# Patient Record
Sex: FEMALE | Race: BLACK OR AFRICAN AMERICAN | Employment: FULL TIME | URBAN - METROPOLITAN AREA
[De-identification: names, ages, dates, MRNs, and addresses within clinical notes are randomized per-mention and may not be internally consistent; named-entity substitution may affect disease eponyms.]

---

## 2018-05-24 ENCOUNTER — HOSPITAL ENCOUNTER (OUTPATIENT)
Dept: GENERAL RADIOLOGY | Age: 28
Discharge: HOME | End: 2018-05-24
Attending: PHYSICAL MEDICINE & REHABILITATION
Payer: COMMERCIAL

## 2018-05-24 DIAGNOSIS — M51.36 DEGENERATION OF LUMBAR INTERVERTEBRAL DISC: ICD-10-CM

## 2018-05-24 DIAGNOSIS — M51.37 DEGENERATION OF LUMBAR OR LUMBOSACRAL INTERVERTEBRAL DISC: ICD-10-CM

## 2018-05-24 PROCEDURE — 72110 X-RAY EXAM L-2 SPINE 4/>VWS: CPT

## 2018-05-24 PROCEDURE — 72074 X-RAY EXAM THORAC SPINE4/>VW: CPT

## 2024-10-22 SDOH — HEALTH STABILITY: PHYSICAL HEALTH: ON AVERAGE, HOW MANY DAYS PER WEEK DO YOU ENGAGE IN MODERATE TO STRENUOUS EXERCISE (LIKE A BRISK WALK)?: 0 DAYS

## 2024-10-25 ENCOUNTER — OFFICE VISIT (OUTPATIENT)
Age: 34
End: 2024-10-25
Payer: COMMERCIAL

## 2024-10-25 VITALS
OXYGEN SATURATION: 98 % | SYSTOLIC BLOOD PRESSURE: 114 MMHG | DIASTOLIC BLOOD PRESSURE: 76 MMHG | WEIGHT: 212 LBS | HEIGHT: 66 IN | HEART RATE: 71 BPM | BODY MASS INDEX: 34.07 KG/M2 | RESPIRATION RATE: 14 BRPM

## 2024-10-25 DIAGNOSIS — Z00.00 ROUTINE HISTORY AND PHYSICAL EXAMINATION OF ADULT: ICD-10-CM

## 2024-10-25 DIAGNOSIS — Z00.00 ROUTINE HISTORY AND PHYSICAL EXAMINATION OF ADULT: Primary | ICD-10-CM

## 2024-10-25 PROBLEM — A60.00 GENITAL HERPES: Status: ACTIVE | Noted: 2024-10-25

## 2024-10-25 PROBLEM — L73.2 HIDRADENITIS: Status: ACTIVE | Noted: 2024-10-25

## 2024-10-25 LAB
ALBUMIN SERPL-MCNC: 3.2 G/DL (ref 3.5–5)
ALBUMIN/GLOB SERPL: 0.8 (ref 1.1–2.2)
ALP SERPL-CCNC: 59 U/L (ref 45–117)
ALT SERPL-CCNC: 21 U/L (ref 12–78)
ANION GAP SERPL CALC-SCNC: 5 MMOL/L (ref 2–12)
AST SERPL-CCNC: 14 U/L (ref 15–37)
BASOPHILS # BLD: 0 K/UL (ref 0–0.1)
BASOPHILS NFR BLD: 1 % (ref 0–1)
BILIRUB SERPL-MCNC: 0.3 MG/DL (ref 0.2–1)
BUN SERPL-MCNC: 12 MG/DL (ref 6–20)
BUN/CREAT SERPL: 13 (ref 12–20)
CALCIUM SERPL-MCNC: 9 MG/DL (ref 8.5–10.1)
CHLORIDE SERPL-SCNC: 107 MMOL/L (ref 97–108)
CHOLEST SERPL-MCNC: 171 MG/DL
CO2 SERPL-SCNC: 26 MMOL/L (ref 21–32)
CREAT SERPL-MCNC: 0.94 MG/DL (ref 0.55–1.02)
DIFFERENTIAL METHOD BLD: NORMAL
EOSINOPHIL # BLD: 0.1 K/UL (ref 0–0.4)
EOSINOPHIL NFR BLD: 1 % (ref 0–7)
ERYTHROCYTE [DISTWIDTH] IN BLOOD BY AUTOMATED COUNT: 13.6 % (ref 11.5–14.5)
GLOBULIN SER CALC-MCNC: 4 G/DL (ref 2–4)
GLUCOSE SERPL-MCNC: 85 MG/DL (ref 65–100)
HCT VFR BLD AUTO: 39 % (ref 35–47)
HDLC SERPL-MCNC: 75 MG/DL
HDLC SERPL: 2.3 (ref 0–5)
HGB BLD-MCNC: 12.4 G/DL (ref 11.5–16)
IMM GRANULOCYTES # BLD AUTO: 0 K/UL (ref 0–0.04)
IMM GRANULOCYTES NFR BLD AUTO: 0 % (ref 0–0.5)
LDLC SERPL CALC-MCNC: 81.8 MG/DL (ref 0–100)
LYMPHOCYTES # BLD: 2.3 K/UL (ref 0.8–3.5)
LYMPHOCYTES NFR BLD: 28 % (ref 12–49)
MCH RBC QN AUTO: 27.4 PG (ref 26–34)
MCHC RBC AUTO-ENTMCNC: 31.8 G/DL (ref 30–36.5)
MCV RBC AUTO: 86.3 FL (ref 80–99)
MONOCYTES # BLD: 0.5 K/UL (ref 0–1)
MONOCYTES NFR BLD: 6 % (ref 5–13)
NEUTS SEG # BLD: 5.1 K/UL (ref 1.8–8)
NEUTS SEG NFR BLD: 64 % (ref 32–75)
NRBC # BLD: 0 K/UL (ref 0–0.01)
NRBC BLD-RTO: 0 PER 100 WBC
PLATELET # BLD AUTO: 292 K/UL (ref 150–400)
PMV BLD AUTO: 10.5 FL (ref 8.9–12.9)
POTASSIUM SERPL-SCNC: 4.4 MMOL/L (ref 3.5–5.1)
PROT SERPL-MCNC: 7.2 G/DL (ref 6.4–8.2)
RBC # BLD AUTO: 4.52 M/UL (ref 3.8–5.2)
SODIUM SERPL-SCNC: 138 MMOL/L (ref 136–145)
TRIGL SERPL-MCNC: 71 MG/DL
TSH SERPL DL<=0.05 MIU/L-ACNC: 1.07 UIU/ML (ref 0.36–3.74)
VLDLC SERPL CALC-MCNC: 14.2 MG/DL
WBC # BLD AUTO: 8.1 K/UL (ref 3.6–11)

## 2024-10-25 PROCEDURE — 90715 TDAP VACCINE 7 YRS/> IM: CPT | Performed by: INTERNAL MEDICINE

## 2024-10-25 PROCEDURE — 90471 IMMUNIZATION ADMIN: CPT | Performed by: INTERNAL MEDICINE

## 2024-10-25 PROCEDURE — 99385 PREV VISIT NEW AGE 18-39: CPT | Performed by: INTERNAL MEDICINE

## 2024-10-25 RX ORDER — MINOCYCLINE 40 MG/G
AEROSOL, FOAM TOPICAL
COMMUNITY
Start: 2023-02-09

## 2024-10-25 RX ORDER — VALACYCLOVIR HYDROCHLORIDE 500 MG/1
500 TABLET, FILM COATED ORAL DAILY
COMMUNITY
Start: 2024-10-12

## 2024-10-25 RX ORDER — NORGESTIMATE AND ETHINYL ESTRADIOL 7DAYSX3 LO
KIT ORAL
COMMUNITY
Start: 2021-02-23

## 2024-10-25 ASSESSMENT — PATIENT HEALTH QUESTIONNAIRE - PHQ9
SUM OF ALL RESPONSES TO PHQ9 QUESTIONS 1 & 2: 0
2. FEELING DOWN, DEPRESSED OR HOPELESS: NOT AT ALL
SUM OF ALL RESPONSES TO PHQ QUESTIONS 1-9: 0
SUM OF ALL RESPONSES TO PHQ QUESTIONS 1-9: 0
1. LITTLE INTEREST OR PLEASURE IN DOING THINGS: NOT AT ALL
SUM OF ALL RESPONSES TO PHQ QUESTIONS 1-9: 0
SUM OF ALL RESPONSES TO PHQ QUESTIONS 1-9: 0

## 2024-10-25 NOTE — PATIENT INSTRUCTIONS
The ideal exercise is 150 minutes per week (30 minutes for 5 days or 22 minutes every day) of moderate intensity aerobic exercise. Moderate intensity is walking briskly, riding a bike, pushing a , playing doubles tennis. Two days per week of strengthening for major muscles is also recommended.     I recommend you have updated flu and COVID vaccines this fall, at the pharmacy.

## 2024-10-25 NOTE — PROGRESS NOTES
A/P:    1. Routine history and physical examination of adult  Most immunizations are up to date--flu and COVID recommended, but she declined. TDaP administered today. Pap smear is up to date. She will have fasting labs today. Moderate intensity exercise of at least 150 minutes per week recommended.   - Lipid Panel; Future  - Comprehensive Metabolic Panel; Future  - CBC with Auto Differential; Future  - TSH; Future       Follow up: 1 year CPE         An electronic signature was used to authenticate this note.    --Kimberly Warren MD         HPI: Barb LEBRON Holliday is here to establish a new PCP.       Health maintenance: She has been doing well, has few complaints today. She is exercising little now. Her last pap smear was earlier this year with gynecology, Dr. Laws, who is treating her for genital herpes. She sees dermatology for hidradenitis.      She is fasting for labs today.         Patient Active Problem List   Diagnosis    Genital herpes    Hidradenitis        History reviewed. No pertinent past medical history.    Past Surgical History:   Procedure Laterality Date    OTHER SURGICAL HISTORY      axillary sweat glands excised for hidradenitis       Family History   Problem Relation Age of Onset    Miscarriages / Stillbirths Mother     Thyroid Disease Mother     Asthma Mother     Alcohol Abuse Father     Substance Abuse Father     No Known Problems Brother        Social History     Tobacco Use    Smoking status: Never    Smokeless tobacco: Never   Vaping Use    Vaping status: Never Used   Substance Use Topics    Alcohol use: Yes     Comment: Only occasions    Drug use: Never         ROS:   General--negative  Cardiovascular--negative  Pulmonary--negative  Gastro--negative  Endo--negative  Musculoskeletal--negative  Neuro--negative  Derm--negative  Psych--negative         Current Outpatient Medications:     TRI-LO-SIMBA 0.18/0.215/0.25 MG-25 MCG TABS, , Disp: , Rfl:     valACYclovir (VALTREX) 500 MG tablet, Take 1

## 2025-01-30 ENCOUNTER — OFFICE VISIT (OUTPATIENT)
Facility: CLINIC | Age: 35
End: 2025-01-30
Payer: COMMERCIAL

## 2025-01-30 VITALS
HEART RATE: 76 BPM | OXYGEN SATURATION: 96 % | SYSTOLIC BLOOD PRESSURE: 122 MMHG | WEIGHT: 219.6 LBS | BODY MASS INDEX: 35.29 KG/M2 | DIASTOLIC BLOOD PRESSURE: 85 MMHG | HEIGHT: 66 IN

## 2025-01-30 DIAGNOSIS — K59.00 CONSTIPATION, UNSPECIFIED CONSTIPATION TYPE: Primary | ICD-10-CM

## 2025-01-30 PROCEDURE — 99213 OFFICE O/P EST LOW 20 MIN: CPT | Performed by: INTERNAL MEDICINE

## 2025-01-30 RX ORDER — MAGNESIUM CARB/ALUMINUM HYDROX 105-160MG
296 TABLET,CHEWABLE ORAL ONCE
Qty: 296 ML | Refills: 0 | Status: SHIPPED | OUTPATIENT
Start: 2025-01-30 | End: 2025-01-30

## 2025-01-30 SDOH — ECONOMIC STABILITY: FOOD INSECURITY: WITHIN THE PAST 12 MONTHS, THE FOOD YOU BOUGHT JUST DIDN'T LAST AND YOU DIDN'T HAVE MONEY TO GET MORE.: NEVER TRUE

## 2025-01-30 SDOH — ECONOMIC STABILITY: FOOD INSECURITY: WITHIN THE PAST 12 MONTHS, YOU WORRIED THAT YOUR FOOD WOULD RUN OUT BEFORE YOU GOT MONEY TO BUY MORE.: NEVER TRUE

## 2025-01-30 ASSESSMENT — PATIENT HEALTH QUESTIONNAIRE - PHQ9
SUM OF ALL RESPONSES TO PHQ9 QUESTIONS 1 & 2: 0
SUM OF ALL RESPONSES TO PHQ QUESTIONS 1-9: 0
SUM OF ALL RESPONSES TO PHQ QUESTIONS 1-9: 0
1. LITTLE INTEREST OR PLEASURE IN DOING THINGS: NOT AT ALL
2. FEELING DOWN, DEPRESSED OR HOPELESS: NOT AT ALL
SUM OF ALL RESPONSES TO PHQ QUESTIONS 1-9: 0
SUM OF ALL RESPONSES TO PHQ QUESTIONS 1-9: 0

## 2025-01-30 NOTE — PATIENT INSTRUCTIONS
Magnesium citrate take 1/2 bottle, repeat in four hours if needed.     After that, try taking Miralax 1 capful once daily    Citrucel 2 g daily (one tablespoon) once daily

## 2025-01-30 NOTE — PROGRESS NOTES
A/P:         ICD-10-CM    1. Constipation, unspecified constipation type  K59.00 Magnesium Citrate (EQL MAGNESIUM CITRATE) 1.745 GM/30ML solution         Assessment & Plan  1. Constipation.  Unclear etiology.  Advised her to remain off those additional gummy supplements she was using.  Labs from October 2024 showed normal liver function, gallbladder function, and thyroid levels.  A prescription for magnesium citrate has been provided, with instructions to consume half a bottle initially and the remaining half after a few hours if no significant bowel movement occurs. Subsequently, she is to take one capful of MiraLAX daily to promote regular bowel movements. Regular exercise, continued hydration, and a diet rich in fiber are recommended. If her condition does not improve, a referral to a gastroenterologist will be considered.           Follow up:  prn         An electronic signature was used to authenticate this note.    --Kimberly Warren MD         HPI: Barb Holliday is here for an acute visit.      History of Present Illness  The patient presents for evaluation of constipation.    She has been experiencing a change in her bowel habits since December 2024, characterized by infrequent, small, and hard stools. She reports no presence of blood in her stools. She speculates that the change in her bowel habits may be related to a recent switch in her probiotic brand and the initiation of a gummy supplement for hair health. Despite discontinuing the gummy supplement on 01/05/2025, her symptoms persisted. Her bowel movements have been occurring once every few days, a frequency that was even lower prior to the use of a laxative. She recalls having only four bowel movements in the two weeks preceding the use of the laxative. The laxative, a combination of a pill and a stool softener, was taken on a Saturday afternoon, followed by the consumption of an herbal tea with honey. This resulted in a bowel movement around 2 or